# Patient Record
Sex: FEMALE | Race: WHITE
[De-identification: names, ages, dates, MRNs, and addresses within clinical notes are randomized per-mention and may not be internally consistent; named-entity substitution may affect disease eponyms.]

---

## 2017-01-31 ENCOUNTER — HOSPITAL ENCOUNTER (OUTPATIENT)
Dept: HOSPITAL 84 - D.MAMMO | Age: 77
Discharge: HOME | End: 2017-01-31
Attending: FAMILY MEDICINE
Payer: MEDICARE

## 2017-01-31 VITALS — BODY MASS INDEX: 23.2 KG/M2

## 2017-01-31 DIAGNOSIS — Z12.31: Primary | ICD-10-CM

## 2017-04-04 ENCOUNTER — HOSPITAL ENCOUNTER (OUTPATIENT)
Dept: HOSPITAL 84 - D.US | Age: 77
Discharge: HOME | End: 2017-04-04
Attending: FAMILY MEDICINE
Payer: MEDICARE

## 2017-04-04 VITALS — BODY MASS INDEX: 23.2 KG/M2

## 2017-04-04 DIAGNOSIS — R10.11: Primary | ICD-10-CM

## 2017-05-19 ENCOUNTER — HOSPITAL ENCOUNTER (OUTPATIENT)
Dept: HOSPITAL 84 - D.NM | Age: 77
Discharge: HOME | End: 2017-05-19
Attending: FAMILY MEDICINE
Payer: MEDICARE

## 2017-05-19 VITALS — BODY MASS INDEX: 23.2 KG/M2

## 2017-05-19 DIAGNOSIS — R10.9: Primary | ICD-10-CM

## 2017-10-04 ENCOUNTER — HOSPITAL ENCOUNTER (OUTPATIENT)
Dept: HOSPITAL 84 - D.LABREF | Age: 77
Discharge: HOME | End: 2017-10-04
Attending: UROLOGY
Payer: MEDICARE

## 2017-10-04 VITALS — BODY MASS INDEX: 23.2 KG/M2

## 2017-10-04 DIAGNOSIS — R31.9: Primary | ICD-10-CM

## 2017-10-09 ENCOUNTER — HOSPITAL ENCOUNTER (OUTPATIENT)
Dept: HOSPITAL 84 - D.MRI | Age: 77
Discharge: HOME | End: 2017-10-09
Attending: UROLOGY
Payer: MEDICARE

## 2017-10-09 VITALS — BODY MASS INDEX: 23.2 KG/M2

## 2017-10-09 DIAGNOSIS — M54.14: Primary | ICD-10-CM

## 2017-10-09 DIAGNOSIS — M54.16: ICD-10-CM

## 2017-11-09 ENCOUNTER — HOSPITAL ENCOUNTER (OUTPATIENT)
Dept: HOSPITAL 84 - D.OPS | Age: 77
Discharge: HOME | End: 2017-11-09
Attending: UROLOGY
Payer: MEDICARE

## 2017-11-09 VITALS — BODY MASS INDEX: 23.6 KG/M2

## 2017-11-09 VITALS — DIASTOLIC BLOOD PRESSURE: 57 MMHG | SYSTOLIC BLOOD PRESSURE: 114 MMHG

## 2017-11-09 DIAGNOSIS — Z01.812: ICD-10-CM

## 2017-11-09 DIAGNOSIS — I10: ICD-10-CM

## 2017-11-09 DIAGNOSIS — E03.9: ICD-10-CM

## 2017-11-09 DIAGNOSIS — R31.29: Primary | ICD-10-CM

## 2017-11-09 DIAGNOSIS — K21.9: ICD-10-CM

## 2017-11-09 LAB
ERYTHROCYTE [DISTWIDTH] IN BLOOD BY AUTOMATED COUNT: 13.7 % (ref 11.5–14.5)
HCT VFR BLD CALC: 38.4 % (ref 36–48)
HGB BLD-MCNC: 13 G/DL (ref 12–16)
MCH RBC QN AUTO: 30.2 PG (ref 26–34)
MCHC RBC AUTO-ENTMCNC: 33.9 G/DL (ref 31–37)
MCV RBC: 89.1 FL (ref 80–100)
PLATELET # BLD: 205 10X3/UL (ref 130–400)
PMV BLD AUTO: 11.6 FL (ref 7.4–10.4)
RBC # BLD AUTO: 4.31 10X6/UL (ref 4–5.4)
WBC # BLD AUTO: 5.4 10X3/UL (ref 4.8–10.8)

## 2017-11-09 NOTE — OP
PATIENT NAME:  CHAITANYA NEGRETE                        MEDICAL RECORD: [id number]
:40                                             LOCATION:D.OPS          
                                                         ADMISSION DATE:        
SURGEON:  LAWRENCE BASHIR MD              
 
 
DATE OF OPERATION:  2017
 
SURGEON:  Lawrence Bashir MD
 
ANESTHESIA:  MAC by Davin Carrera CRNA.
 
PREOPERATIVE DIAGNOSIS:  Microhematuria.
 
PROCEDURE:  Cystoscopy.
 
FINDINGS:  Single ureteral orifices bilaterally.  No bladder tumors.  Some
bladder inflammation at the dome.
 
SPECIMENS:  None.
 
BLOOD LOSS:  None.
 
CLINICAL HISTORY:  This is a 77-year-old female, who is a nonsmoker.  She was
noticed to have microhematuria on routine urinalysis.  There was also some right
flank pain.  She had a CT scan of the abdomen and pelvis, which showed no
masses, stones, or hydronephrosis.  Urine cytology shows no tumors cells.  She
comes today to have cystoscopy performed.  In terms of her urinary voiding
symptoms, she has some urinary frequency for the past 10 years, but she is not
troubled by it.  There is no dysuria.
 
DESCRIPTION OF PROCEDURE:  The patient was given Ancef on call to the OR.  She
is allergic to CODEINE.  She was given IV sedation.  She was then placed in the
dorsal lithotomy position.  She was prepped and draped.  A 17-French cystoscope
with 30-degree lens was used for visualization.  The findings are as outlined
above.  No bladder tumors were seen.  The bladder was then fully emptied through
the cystoscope sheath and then the scope was removed.  Lidocaine jelly was
inserted into the urethra after the procedure for local anesthetic.
 
TRANSINT:[id number] Voice Confirmation ID: [id number] DOCUMENT ID: [id number]
                                           
                                           LAWRENCE BASHIR MD              
 
 
 
Electronically Signed by LAWRENCE BASHIR on 17 at 1145
 
 
 
 
CC:                                                             [id number]
DICTATION DATE: 17 0805     :     17 1017      [facility] 
                                                                      [address]
[place] [phone]

## 2018-02-08 ENCOUNTER — HOSPITAL ENCOUNTER (OUTPATIENT)
Dept: HOSPITAL 84 - D.MAMMO | Age: 78
Discharge: HOME | End: 2018-02-08
Attending: FAMILY MEDICINE
Payer: MEDICARE

## 2018-02-08 VITALS — BODY MASS INDEX: 23.6 KG/M2

## 2018-02-08 DIAGNOSIS — Z12.31: Primary | ICD-10-CM

## 2019-02-12 ENCOUNTER — HOSPITAL ENCOUNTER (OUTPATIENT)
Dept: HOSPITAL 84 - D.MAMMO | Age: 79
Discharge: HOME | End: 2019-02-12
Attending: FAMILY MEDICINE
Payer: COMMERCIAL

## 2019-02-12 VITALS — BODY MASS INDEX: 23.6 KG/M2

## 2019-02-12 DIAGNOSIS — Z12.31: Primary | ICD-10-CM

## 2020-06-01 ENCOUNTER — HOSPITAL ENCOUNTER (OUTPATIENT)
Dept: HOSPITAL 84 - D.MAMMO | Age: 80
Discharge: HOME | End: 2020-06-01
Attending: FAMILY MEDICINE
Payer: MEDICARE

## 2020-06-01 VITALS — BODY MASS INDEX: 23.6 KG/M2

## 2020-06-01 DIAGNOSIS — Z12.31: Primary | ICD-10-CM
